# Patient Record
Sex: MALE | Race: WHITE | Employment: FULL TIME | URBAN - METROPOLITAN AREA
[De-identification: names, ages, dates, MRNs, and addresses within clinical notes are randomized per-mention and may not be internally consistent; named-entity substitution may affect disease eponyms.]

---

## 2019-06-19 ENCOUNTER — OFFICE VISIT (OUTPATIENT)
Dept: FAMILY MEDICINE | Facility: CLINIC | Age: 34
End: 2019-06-19
Payer: COMMERCIAL

## 2019-06-19 VITALS
HEART RATE: 69 BPM | BODY MASS INDEX: 34.71 KG/M2 | DIASTOLIC BLOOD PRESSURE: 84 MMHG | HEIGHT: 68 IN | TEMPERATURE: 98.5 F | OXYGEN SATURATION: 99 % | WEIGHT: 229 LBS | SYSTOLIC BLOOD PRESSURE: 130 MMHG

## 2019-06-19 DIAGNOSIS — Z00.00 ROUTINE GENERAL MEDICAL EXAMINATION AT A HEALTH CARE FACILITY: Primary | ICD-10-CM

## 2019-06-19 DIAGNOSIS — Z13.6 CARDIOVASCULAR SCREENING; LDL GOAL LESS THAN 160: ICD-10-CM

## 2019-06-19 DIAGNOSIS — B35.6 TINEA CRURIS: ICD-10-CM

## 2019-06-19 DIAGNOSIS — B00.1 RECURRENT COLD SORES: ICD-10-CM

## 2019-06-19 DIAGNOSIS — Z23 NEED FOR TDAP VACCINATION: ICD-10-CM

## 2019-06-19 LAB
ALBUMIN UR-MCNC: ABNORMAL MG/DL
APPEARANCE UR: CLEAR
BACTERIA #/AREA URNS HPF: ABNORMAL /HPF
BILIRUB UR QL STRIP: NEGATIVE
COLOR UR AUTO: YELLOW
ERYTHROCYTE [DISTWIDTH] IN BLOOD BY AUTOMATED COUNT: 13.6 % (ref 10–15)
GLUCOSE UR STRIP-MCNC: NEGATIVE MG/DL
HCT VFR BLD AUTO: 41.9 % (ref 40–53)
HGB BLD-MCNC: 14.8 G/DL (ref 13.3–17.7)
HGB UR QL STRIP: NEGATIVE
KETONES UR STRIP-MCNC: NEGATIVE MG/DL
LEUKOCYTE ESTERASE UR QL STRIP: NEGATIVE
MCH RBC QN AUTO: 28.6 PG (ref 26.5–33)
MCHC RBC AUTO-ENTMCNC: 35.3 G/DL (ref 31.5–36.5)
MCV RBC AUTO: 81 FL (ref 78–100)
NITRATE UR QL: NEGATIVE
NON-SQ EPI CELLS #/AREA URNS LPF: ABNORMAL /LPF
PH UR STRIP: 6 PH (ref 5–7)
PLATELET # BLD AUTO: 264 10E9/L (ref 150–450)
RBC # BLD AUTO: 5.18 10E12/L (ref 4.4–5.9)
RBC #/AREA URNS AUTO: ABNORMAL /HPF
SOURCE: ABNORMAL
SP GR UR STRIP: 1.02 (ref 1–1.03)
UROBILINOGEN UR STRIP-ACNC: 0.2 EU/DL (ref 0.2–1)
WBC # BLD AUTO: 4.4 10E9/L (ref 4–11)
WBC #/AREA URNS AUTO: ABNORMAL /HPF

## 2019-06-19 PROCEDURE — 36415 COLL VENOUS BLD VENIPUNCTURE: CPT | Performed by: FAMILY MEDICINE

## 2019-06-19 PROCEDURE — 86696 HERPES SIMPLEX TYPE 2 TEST: CPT | Performed by: FAMILY MEDICINE

## 2019-06-19 PROCEDURE — 84443 ASSAY THYROID STIM HORMONE: CPT | Performed by: FAMILY MEDICINE

## 2019-06-19 PROCEDURE — 99385 PREV VISIT NEW AGE 18-39: CPT | Mod: 25 | Performed by: FAMILY MEDICINE

## 2019-06-19 PROCEDURE — 86695 HERPES SIMPLEX TYPE 1 TEST: CPT | Performed by: FAMILY MEDICINE

## 2019-06-19 PROCEDURE — 86694 HERPES SIMPLEX NES ANTBDY: CPT | Performed by: FAMILY MEDICINE

## 2019-06-19 PROCEDURE — 90471 IMMUNIZATION ADMIN: CPT | Performed by: FAMILY MEDICINE

## 2019-06-19 PROCEDURE — 90715 TDAP VACCINE 7 YRS/> IM: CPT | Performed by: FAMILY MEDICINE

## 2019-06-19 PROCEDURE — 85027 COMPLETE CBC AUTOMATED: CPT | Performed by: FAMILY MEDICINE

## 2019-06-19 PROCEDURE — 80061 LIPID PANEL: CPT | Performed by: FAMILY MEDICINE

## 2019-06-19 PROCEDURE — 81001 URINALYSIS AUTO W/SCOPE: CPT | Performed by: FAMILY MEDICINE

## 2019-06-19 PROCEDURE — 80053 COMPREHEN METABOLIC PANEL: CPT | Performed by: FAMILY MEDICINE

## 2019-06-19 RX ORDER — LANOLIN ALCOHOL/MO/W.PET/CERES
CREAM (GRAM) TOPICAL DAILY
COMMUNITY

## 2019-06-19 RX ORDER — CHLORAL HYDRATE 500 MG
2 CAPSULE ORAL DAILY
COMMUNITY

## 2019-06-19 RX ORDER — VALACYCLOVIR HYDROCHLORIDE 1 G/1
2000 TABLET, FILM COATED ORAL 2 TIMES DAILY
Qty: 8 TABLET | Refills: 3 | Status: SHIPPED | OUTPATIENT
Start: 2019-06-19 | End: 2019-10-29

## 2019-06-19 RX ORDER — FAMOTIDINE 20 MG
TABLET ORAL
COMMUNITY

## 2019-06-19 ASSESSMENT — MIFFLIN-ST. JEOR: SCORE: 1958.24

## 2019-06-19 NOTE — PROGRESS NOTES
SUBJECTIVE:   CC: Сергей Cowan is an 34 year old male who presents for preventive health visit.     Healthy Habits:    Do you get at least three servings of calcium containing foods daily (dairy, green leafy vegetables, etc.)? no, taking calcium and/or vitamin D supplement: yes - multi    Amount of exercise or daily activities, outside of work: hockey weekly, golfing    Problems taking medications regularly No    Medication side effects: No    Have you had an eye exam in the past two years? yes    Do you see a dentist twice per year? yes    Do you have sleep apnea, excessive snoring or daytime drowsiness?no    Hx of Elevated Blood Pressure: Patient has had elevated blood pressure readings in the past. Patient presented today as normotensive with a blood pressure of 130/84. Patient is not currently prescribed any medications for blood pressure regulation.     Recurrent Cold Sores: Patient reports he has recurrent cold sores specifically in the winter. Patient states he has 1-2 cold sores per year.     BP Readings from Last 1 Encounters:   06/19/19 130/84     Today's PHQ-2 Score:   PHQ-2 ( 1999 Pfizer) 6/19/2019   Q1: Little interest or pleasure in doing things 1   Q2: Feeling down, depressed or hopeless 1   PHQ-2 Score 2       Abuse: Current or Past(Physical, Sexual or Emotional)- No  Do you feel safe in your environment? Yes    Social History     Tobacco Use     Smoking status: Never Smoker     Smokeless tobacco: Never Used   Substance Use Topics     Alcohol use: Yes     Alcohol/week: 0.6 - 1.2 oz     Types: 1 - 2 Standard drinks or equivalent per week     Comment: 1-2 per week     If you drink alcohol do you typically have >3 drinks per day or >7 drinks per week? No                      Last PSA: No results found for: PSA    Reviewed orders with patient. Reviewed health maintenance and updated orders accordingly - Yes    Reviewed and updated as needed this visit by clinical staff  Tobacco  Allergies  Meds  Med  Hx  Surg Hx  Fam Hx  Soc Hx      Reviewed and updated as needed this visit by Provider  Allergies        Health Maintenance     Colonoscopy:  At age 51   FIT:  At age 41              PSA:  At age 41   DEXA:  N/A    Health Maintenance Due   Topic Date Due     PREVENTIVE CARE VISIT  1986     DTAP/TDAP/TD IMMUNIZATION (1 - Tdap) 01/02/2011     PHQ-2  01/01/2019       Current Problem List    Patient Active Problem List   Diagnosis     CARDIOVASCULAR SCREENING; LDL GOAL LESS THAN 160     Recurrent cold sores       Past Medical History    Past Medical History:   Diagnosis Date     CARDIOVASCULAR SCREENING; LDL GOAL LESS THAN 160      Recurrent cold sores        Past Surgical History    Past Surgical History:   Procedure Laterality Date     DENTAL SURGERY  2007    wisdom teeth       Current Medications    Current Outpatient Medications   Medication Sig Dispense Refill     cyanocobalamin (VITAMIN B-12) 1000 MCG tablet Take by mouth daily       fish oil-omega-3 fatty acids 1000 MG capsule Take 2 g by mouth daily       Multiple Vitamins-Minerals (MULTIVITAL PO)        valACYclovir (VALTREX) 1000 mg tablet Take 2 tablets (2,000 mg) by mouth 2 times daily for 1 day 8 tablet 3     Vitamin D, Cholecalciferol, 1000 units CAPS          Allergies    No Known Allergies    Immunizations    Immunization History   Administered Date(s) Administered     MMR 03/25/1998     TDAP Vaccine (Adacel) 06/19/2019     Td (Adult), Adsorbed 03/25/1998, 09/15/2005       Family History    Family History   Problem Relation Age of Onset     Hypertension Father      Diabetes Paternal Grandfather        Social History    Social History     Socioeconomic History     Marital status: Single     Spouse name: Not on file     Number of children: 0     Years of education: 12     Highest education level: Not on file   Occupational History     Not on file   Social Needs     Financial resource strain: Not on file     Food insecurity:     Worry: Not on file  "    Inability: Not on file     Transportation needs:     Medical: Not on file     Non-medical: Not on file   Tobacco Use     Smoking status: Never Smoker     Smokeless tobacco: Never Used   Substance and Sexual Activity     Alcohol use: Yes     Alcohol/week: 0.6 - 1.2 oz     Types: 1 - 2 Standard drinks or equivalent per week     Comment: 1-2 per week     Drug use: Never     Sexual activity: Yes   Lifestyle     Physical activity:     Days per week: Not on file     Minutes per session: Not on file     Stress: Not on file   Relationships     Social connections:     Talks on phone: Not on file     Gets together: Not on file     Attends [employer] service: Not on file     Active member of club or organization: Not on file     Attends meetings of clubs or organizations: Not on file     Relationship status: Not on file     Intimate partner violence:     Fear of current or ex partner: Not on file     Emotionally abused: Not on file     Physically abused: Not on file     Forced sexual activity: Not on file   Other Topics Concern     Not on file   Social History Narrative     Not on file       ROS:  Constitutional, HEENT, cardiovascular, pulmonary, GI, , musculoskeletal, neuro, skin, endocrine and psych systems are negative, except as otherwise noted.    OBJECTIVE:   /84   Pulse 69   Temp 98.5  F (36.9  C) (Oral)   Ht 1.727 m (5' 8\")   Wt 103.9 kg (229 lb)   SpO2 99%   BMI 34.82 kg/m    EXAM:  GENERAL: healthy, alert and no distress  EYES: Eyes grossly normal to inspection  HENT:ear canals and TM's normal upon viewing with otoscope, nose and mouth without ulcers or lesions upon viewing with otoscope  NECK: no adenopathy, no asymmetry, masses, or scars and thyroid normal to palpation  RESP: lungs clear to auscultation - no rales, rhonchi or wheezes  CV: regular rate and rhythm, normal S1 S2, no S3 or S4, no murmur, click or rub, no peripheral edema and peripheral pulses strong  ABDOMEN: soft, nontender, no " hepatosplenomegaly, no masses and bowel sounds normal   (male): normal male genitalia without lesions or urethral discharge, no hernia, tinea cruris moderate bilaterally  MS: no gross musculoskeletal defects noted, no edema  SKIN: no suspicious lesions or rashes  NEURO: Normal strength and tone, mentation intact and speech normal  PSYCH: mentation appears normal, affect normal/bright  BACK: no CVA tenderness, no paralumbar tenderness    Diagnostic Test Results:  Results for orders placed or performed in visit on 06/19/19 (from the past 24 hour(s))   CBC with platelets   Result Value Ref Range    WBC 4.4 4.0 - 11.0 10e9/L    RBC Count 5.18 4.4 - 5.9 10e12/L    Hemoglobin 14.8 13.3 - 17.7 g/dL    Hematocrit 41.9 40.0 - 53.0 %    MCV 81 78 - 100 fl    MCH 28.6 26.5 - 33.0 pg    MCHC 35.3 31.5 - 36.5 g/dL    RDW 13.6 10.0 - 15.0 %    Platelet Count 264 150 - 450 10e9/L   UA reflex to Microscopic and Culture   Result Value Ref Range    Color Urine Yellow     Appearance Urine Clear     Glucose Urine Negative NEG^Negative mg/dL    Bilirubin Urine Negative NEG^Negative    Ketones Urine Negative NEG^Negative mg/dL    Specific Gravity Urine 1.025 1.003 - 1.035    Blood Urine Negative NEG^Negative    pH Urine 6.0 5.0 - 7.0 pH    Protein Albumin Urine Trace (A) NEG^Negative mg/dL    Urobilinogen Urine 0.2 0.2 - 1.0 EU/dL    Nitrite Urine Negative NEG^Negative    Leukocyte Esterase Urine Negative NEG^Negative    Source Midstream Urine    Urine Microscopic   Result Value Ref Range    WBC Urine 0 - 5 OTO5^0 - 5 /HPF    RBC Urine O - 2 OTO2^O - 2 /HPF    Squamous Epithelial /LPF Urine Few FEW^Few /LPF    Bacteria Urine Few (A) NEG^Negative /HPF        Labs Pending    ASSESSMENT/PLAN:       ICD-10-CM    1. Routine general medical examination at a health care facility Z00.00 Comprehensive metabolic panel     CBC with platelets     Lipid panel reflex to direct LDL Fasting     TSH with free T4 reflex     UA reflex to Microscopic and  "Culture     Urine Microscopic   2. CARDIOVASCULAR SCREENING; LDL GOAL LESS THAN 160 Z13.6    3. Recurrent cold sores B00.1 Herpes Simplex Virus 1 and 2 IgG     Herpes: HSV IgM antibody     valACYclovir (VALTREX) 1000 mg tablet   4. Tinea cruris B35.6    5. Need for Tdap vaccination Z23 TDAP, IM (10 - 64 YRS) - Adacel     ADMIN 1st VACCINE     Discussed treatment/modality options, including risk and benefits, he desires advised 1 multivitamin per day, advised dentist every 6 months, advised diet and exercise and advised opthalmologist every 1-2 years. All diagnosis above reviewed and noted above, otherwise stable.  See Floored orders for further details.      1) Recommend low salt diet and 150 minutes of exercise per week.    2) Patient prescribed Valtrex as needed today for recurrent cold sore treatment.     3) Received Tdap tetanus booster today. Recommend Hepatitis A&B vaccine.     4) Recommend OTC Lamisil cream for tinea cruris treatment.     5) Follow up in 1-3 years for complete physical exam.     Health Maintenance Due   Topic Date Due     PREVENTIVE CARE VISIT  1986     DTAP/TDAP/TD IMMUNIZATION (1 - Tdap) 01/02/2011     PHQ-2  01/01/2019       COUNSELING:  Reviewed preventive health counseling, as reflected in patient instructions    Estimated body mass index is 34.82 kg/m  as calculated from the following:    Height as of this encounter: 1.727 m (5' 8\").    Weight as of this encounter: 103.9 kg (229 lb).    Weight management plan: Discussed healthy diet and exercise guidelines     reports that he has never smoked. He has never used smokeless tobacco.    Counseling Resources:  ATP IV Guidelines  Pooled Cohorts Equation Calculator  FRAX Risk Assessment  ICSI Preventive Guidelines  Dietary Guidelines for Americans, 2010  USDA's MyPlate  ASA Prophylaxis  Lung CA Screening    This document serves as a record of the services and decisions personally performed and made by Abhijit Sandoval MD. It was created on " his behalf by Arnie Hoang, a trained medical scribe. The creation of this document is based on the provider's statements to the medical scribe.  Arnie Hoang June 19, 2019 11:00 AM     The information in this document, created by the medical scribe for me, accurately reflects the services I personally performed and the decisions made by me. I have reviewed and approved this document for accuracy prior to leaving the patient care area.  June 19, 2019          Abhijit Sandoval MD, FAAFP    [address]  [address]    [phone] Fax

## 2019-06-19 NOTE — LETTER
[facility]  [address] [place] [address], [place] [address]                  [phone]   June 28, 2019    Сергей Cowan  [address] [place] [address]      Dear Сергей,    Here is a summary of your recent test results:    Labs are overall quite good, with evidence of immunity/old cold sore infections.     We advise:     Continue current cares.   Balanced low cholesterol diet.   Regular exercise.     For additional lab test information, [email or web address] is an excellent reference.     Your test results are enclosed.      Please contact me if you have any questions.              Thank you very much for trusting [facility]..     Healthy regards,        Abhijit Sandoval M.D.        Results for orders placed or performed in visit on 06/19/19   Comprehensive metabolic panel   Result Value Ref Range    Sodium 141 133 - 144 mmol/L    Potassium 4.3 3.4 - 5.3 mmol/L    Chloride 108 94 - 109 mmol/L    Carbon Dioxide 21 20 - 32 mmol/L    Anion Gap 12 3 - 14 mmol/L    Glucose 92 70 - 99 mg/dL    Urea Nitrogen 13 7 - 30 mg/dL    Creatinine 1.06 0.66 - 1.25 mg/dL    GFR Estimate >90 >60 mL/min/[1.73_m2]    GFR Estimate If Black >90 >60 mL/min/[1.73_m2]    Calcium 9.1 8.5 - 10.1 mg/dL    Bilirubin Total 0.7 0.2 - 1.3 mg/dL    Albumin 4.1 3.4 - 5.0 g/dL    Protein Total 8.1 6.8 - 8.8 g/dL    Alkaline Phosphatase 67 40 - 150 U/L    ALT 53 0 - 70 U/L    AST 33 0 - 45 U/L   CBC with platelets   Result Value Ref Range    WBC 4.4 4.0 - 11.0 10e9/L    RBC Count 5.18 4.4 - 5.9 10e12/L    Hemoglobin 14.8 13.3 - 17.7 g/dL    Hematocrit 41.9 40.0 - 53.0 %    MCV 81 78 - 100 fl    MCH 28.6 26.5 - 33.0 pg    MCHC 35.3 31.5 - 36.5 g/dL    RDW 13.6 10.0 - 15.0 %    Platelet Count 264 150 - 450 10e9/L   Lipid panel reflex to direct LDL Fasting   Result Value Ref Range    Cholesterol 176 <200 mg/dL    Triglycerides 78 <150 mg/dL    HDL Cholesterol 45 >39 mg/dL    LDL Cholesterol Calculated 115  (H) <100 mg/dL    Non HDL Cholesterol 131 (H) <130 mg/dL   TSH with free T4 reflex   Result Value Ref Range    TSH 1.44 0.40 - 4.00 mU/L   UA reflex to Microscopic and Culture   Result Value Ref Range    Color Urine Yellow     Appearance Urine Clear     Glucose Urine Negative NEG^Negative mg/dL    Bilirubin Urine Negative NEG^Negative    Ketones Urine Negative NEG^Negative mg/dL    Specific Gravity Urine 1.025 1.003 - 1.035    Blood Urine Negative NEG^Negative    pH Urine 6.0 5.0 - 7.0 pH    Protein Albumin Urine Trace (A) NEG^Negative mg/dL    Urobilinogen Urine 0.2 0.2 - 1.0 EU/dL    Nitrite Urine Negative NEG^Negative    Leukocyte Esterase Urine Negative NEG^Negative    Source Midstream Urine    Herpes Simplex Virus 1 and 2 IgG   Result Value Ref Range    Herpes Simplex Virus Type 1 IgG >8.0 (H) 0.0 - 0.8 AI    Herpes Simplex Virus Type 2 IgG <0.2 0.0 - 0.8 AI   Herpes: HSV IgM antibody   Result Value Ref Range    Herpes Simplex Virus IgM Antibody 0.35 0.00 - 0.89 Index Value   Urine Microscopic   Result Value Ref Range    WBC Urine 0 - 5 OTO5^0 - 5 /HPF    RBC Urine O - 2 OTO2^O - 2 /HPF    Squamous Epithelial /LPF Urine Few FEW^Few /LPF    Bacteria Urine Few (A) NEG^Negative /HPF

## 2019-06-19 NOTE — PATIENT INSTRUCTIONS
[facility]                        To reach your care team during and after hours:   [phone]  To reach our pharmacy:        [phone]    Clinic Hours                        Our clinic hours are:    Monday   7:30 am to 7:00 pm                  Tuesday through Friday 7:30 am to 5:00 pm                             Saturday   8:00 am to 12:00 pm      Sunday   Closed      Pharmacy Hours                        Our pharmacy hours are:    Monday   8:30 am to 7:00 pm       Tuesday to Friday  8:30 am to 6:00 pm                       Saturday    9:00 am to 1:00 pm              Sunday    Closed              There is also information available at our web site:  [email or web address]    If your provider ordered any lab tests and you do not receive the results within 10 business days, please call the clinic.    If you need a medication refill please contact your pharmacy.  Please allow 2-3 business days for your refill to be completed.    Our clinic offers telephone visits and e visits.  Please ask one of your team members to explain more.      Use [employer] (secure email communication and access to your chart) to send your primary care provider a message or make an appointment. Ask someone on your Team how to sign up for [employer]  Immunizations                      Immunization History   Administered Date(s) Administered     MMR 03/25/1998     Td (Adult), Adsorbed 03/25/1998, 09/15/2005        Health Maintenance                         Health Maintenance Due   Topic Date Due     Preventive Care Visit  1986     One-time HIV Screening  01/02/2001     Diptheria Tetanus Pertussis (DTAP/TDAP/TD) Vaccine (1 - Tdap) 01/02/2011     PHQ-2  01/01/2019       Preventive Health Recommendations  Male Ages 26 - 39    Yearly exam:             See your health care provider every year in order to  o   Review health changes.   o   Discuss preventive care.    o   Review your medicines if your doctor has prescribed  any.    You should be tested each year for STDs (sexually transmitted diseases), if you re at risk.     After age 35, talk to your provider about cholesterol testing. If you are at risk for heart disease, have your cholesterol tested at least every 5 years.     If you are at risk for diabetes, you should have a diabetes test (fasting glucose).  Shots: Get a flu shot each year. Get a tetanus shot every 10 years.     Nutrition:    Eat at least 5 servings of fruits and vegetables daily.     Eat whole-grain bread, whole-wheat pasta and brown rice instead of white grains and rice.     Get adequate Calcium and Vitamin D.     Lifestyle    Exercise for at least 150 minutes a week (30 minutes a day, 5 days a week). This will help you control your weight and prevent disease.     Limit alcohol to one drink per day.     No smoking.     Wear sunscreen to prevent skin cancer.     See your dentist every six months for an exam and cleaning.

## 2019-06-20 LAB
ALBUMIN SERPL-MCNC: 4.1 G/DL (ref 3.4–5)
ALP SERPL-CCNC: 67 U/L (ref 40–150)
ALT SERPL W P-5'-P-CCNC: 53 U/L (ref 0–70)
ANION GAP SERPL CALCULATED.3IONS-SCNC: 12 MMOL/L (ref 3–14)
AST SERPL W P-5'-P-CCNC: 33 U/L (ref 0–45)
BILIRUB SERPL-MCNC: 0.7 MG/DL (ref 0.2–1.3)
BUN SERPL-MCNC: 13 MG/DL (ref 7–30)
CALCIUM SERPL-MCNC: 9.1 MG/DL (ref 8.5–10.1)
CHLORIDE SERPL-SCNC: 108 MMOL/L (ref 94–109)
CHOLEST SERPL-MCNC: 176 MG/DL
CO2 SERPL-SCNC: 21 MMOL/L (ref 20–32)
CREAT SERPL-MCNC: 1.06 MG/DL (ref 0.66–1.25)
GFR SERPL CREATININE-BSD FRML MDRD: >90 ML/MIN/{1.73_M2}
GLUCOSE SERPL-MCNC: 92 MG/DL (ref 70–99)
HDLC SERPL-MCNC: 45 MG/DL
HSV1 IGG SERPL QL IA: >8 AI (ref 0–0.8)
HSV2 IGG SERPL QL IA: <0.2 AI (ref 0–0.8)
LDLC SERPL CALC-MCNC: 115 MG/DL
NONHDLC SERPL-MCNC: 131 MG/DL
POTASSIUM SERPL-SCNC: 4.3 MMOL/L (ref 3.4–5.3)
PROT SERPL-MCNC: 8.1 G/DL (ref 6.8–8.8)
SODIUM SERPL-SCNC: 141 MMOL/L (ref 133–144)
TRIGL SERPL-MCNC: 78 MG/DL
TSH SERPL DL<=0.005 MIU/L-ACNC: 1.44 MU/L (ref 0.4–4)

## 2019-06-27 LAB — HSV 1+2 IGM SER-IMP: 0.35 INDEX VALUE (ref 0–0.89)

## 2019-07-08 ENCOUNTER — MYC MEDICAL ADVICE (OUTPATIENT)
Dept: FAMILY MEDICINE | Facility: CLINIC | Age: 34
End: 2019-07-08

## 2019-07-08 NOTE — TELEPHONE ENCOUNTER
Huddled with MD JING - see my chart message below     Christina Hare RN, BSN  [place] [facility]

## 2019-09-26 ENCOUNTER — NURSE TRIAGE (OUTPATIENT)
Dept: FAMILY MEDICINE | Facility: CLINIC | Age: 34
End: 2019-09-26

## 2019-09-26 NOTE — TELEPHONE ENCOUNTER
Called #   Telephone Information:   Mobile [phone]       Pt noted the blood is bright red and is not a constant issue. Pt notes it is a small amount at this time. Pt advised to keep future appt. Patient stated an understanding and agreed with plan.    Additional Information    Negative: Passed out (i.e., fainted, collapsed and was not responding)    Negative: Shock suspected (e.g., cold/pale/clammy skin, too weak to stand, low BP, rapid pulse)    Negative: Vomiting red blood or black (coffee ground) material    Negative: Sounds like a life-threatening emergency to the triager    Negative: Diarrhea is the main symptom    Negative: Rectal symptoms    Negative: SEVERE rectal bleeding (large blood clots; on and off, or constant bleeding)    Negative: SEVERE dizziness (e.g., unable to stand, requires support to walk, feels like passing out now)    Negative: MODERATE rectal bleeding (small blood clots, passing blood without stool, or toilet water turns red) more than once a day    Negative: Bloody, black, or tarry bowel movements    Negative: High-risk adult (e.g., prior surgery on aorta, abdominal aortic aneurysm)    Negative: Rectal foreign body (inserted or swallowed)    Negative: SEVERE abdominal pain (e.g., excruciating)    Negative: Constant abdominal pain lasting > 2 hours    Negative: Pale skin (pallor) of new onset or worsening    Negative: Patient sounds very sick or weak to the triager    Negative: MODERATE rectal bleeding (small blood clots, passing blood without stool, or toilet water turns red)    Negative: Taking Coumadin (warfarin) or other strong blood thinner, or known bleeding disorder (e.g., thrombocytopenia)    Negative: Colonoscopy in past 72 hours    Negative: Known cirrhosis of the liver (or history of liver failure or ascites)    Negative: Patient wants to be seen    Negative: MILD rectal bleeding (more than just a few drops or streaks)    Negative: Cancer of rectum or intestines (colon)     "Negative: Radiation therapy to lower abdomen or pelvis    Normal formed BM with a few streaks or drops of blood on surface of BM    Answer Assessment - Initial Assessment Questions  1. APPEARANCE of BLOOD: \"What color is it?\" \"Is it passed separately, on the surface of the stool, or mixed in with the stool?\"       Bright red, on the stool  2. AMOUNT: \"How much blood was passed?\"       1/2 teaspoon    3. FREQUENCY: \"How many times has blood been passed with the stools?\"       Every once and a while  4. ONSET: \"When was the blood first seen in the stools?\" (Days or weeks)       About a year or two not consistant  5. DIARRHEA: \"Is there also some diarrhea?\" If so, ask: \"How many diarrhea stools were passed in past 24 hours?\"       No    6. CONSTIPATION: \"Do you have constipation?\" If so, \"How bad is it?\"      No    7. RECURRENT SYMPTOMS: \"Have you had blood in your stools before?\" If so, ask: \"When was the last time?\" and \"What happened that time?\"       Yes some times when wiping    8. BLOOD THINNERS: \"Do you take any blood thinners?\" (e.g., Coumadin/warfarin, Pradaxa/dabigatran, aspirin)      No    9. OTHER SYMPTOMS: \"Do you have any other symptoms?\"  (e.g., abdominal pain, vomiting, dizziness, fever)      No    10. PREGNANCY: \"Is there any chance you are pregnant?\" \"When was your last menstrual period?\"        No    Protocols used: RECTAL BLEEDING-A-[place]    Jose J Jessica RN   [place] [facility]      "

## 2019-10-11 ENCOUNTER — OFFICE VISIT (OUTPATIENT)
Dept: FAMILY MEDICINE | Facility: CLINIC | Age: 34
End: 2019-10-11
Payer: COMMERCIAL

## 2019-10-11 VITALS
WEIGHT: 236 LBS | HEART RATE: 82 BPM | HEIGHT: 68 IN | SYSTOLIC BLOOD PRESSURE: 124 MMHG | TEMPERATURE: 96.9 F | DIASTOLIC BLOOD PRESSURE: 72 MMHG | BODY MASS INDEX: 35.77 KG/M2 | OXYGEN SATURATION: 97 %

## 2019-10-11 DIAGNOSIS — K92.1 BLOOD IN STOOL: Primary | ICD-10-CM

## 2019-10-11 DIAGNOSIS — K62.89 ANAL IRRITATION: ICD-10-CM

## 2019-10-11 PROCEDURE — 99213 OFFICE O/P EST LOW 20 MIN: CPT | Mod: 25 | Performed by: FAMILY MEDICINE

## 2019-10-11 PROCEDURE — 46600 DIAGNOSTIC ANOSCOPY SPX: CPT | Performed by: FAMILY MEDICINE

## 2019-10-11 ASSESSMENT — MIFFLIN-ST. JEOR: SCORE: 1989.99

## 2019-10-11 NOTE — PATIENT INSTRUCTIONS
Patient Education     What Is GERD?     With GERD, the weak LES allows food and fluids to travel back, or reflux, into the esophagus.      If you often have a painful burning feeling in your chest after you eat, you may have gastroesophageal reflux disease (GERD). Heartburn that keeps coming back is a classic symptom of GERD. But you may have other symptoms as well. A GERD diagnosis is made only after a complete evaluation by your healthcare provider.  Note: Chest pain may also be caused by heart problems. Be sure to have all chest pain evaluated by a healthcare provider.   When you have a reflux problem  After you eat, food travels from your mouth down the esophagus to your stomach. Along the way, food passes through a one-way valve called the lower esophageal sphincter (LES). The LES sits at the opening to your stomach. Normally the LES opens when you swallow. It lets food enter the stomach, then closes quickly. With GERD, the LES doesn t work normally. It lets food and stomach acid flow back (reflux) into the esophagus.  Some common symptoms    Frequent heartburn or burping    Sour-tasting fluid backing up into your mouth    Symptoms that get worse after you eat, bend over, or lie down    Trouble swallowing or pain when swallowing    A dry, long-term (chronic) cough    Upset stomach (nausea) or vomiting  Relieving your discomfort  You and your healthcare provider can work together to find the treatment options that best ease your symptoms. These may include lifestyle changes, medicine, and possibly surgery.  Many people find their GERD symptoms decrease when they eat small frequent meals instead of 3 large ones. Reducing the amount of fatty foods in your diet will also help.   The following foods tend to cause problems for people diagnosed with GERD:    Tomatoes and tomato products    Alcohol    Coffee    Peppermint    Greasy or spicy foods  Talk with your provider if you don t understand how to make the  dietary changes needed to control your GERD symptoms. Your provider can refer you to a nutritionist.  Date Last Reviewed: 7/1/2016 2000-2018 [employer]. [address] [place] [address], [place] [address]. All rights reserved. This information is not intended as a substitute for professional medical care. Always follow your healthcare professional's instructions.

## 2019-10-29 ENCOUNTER — MYC REFILL (OUTPATIENT)
Dept: FAMILY MEDICINE | Facility: CLINIC | Age: 34
End: 2019-10-29

## 2019-10-29 DIAGNOSIS — B00.1 RECURRENT COLD SORES: ICD-10-CM

## 2019-10-29 RX ORDER — VALACYCLOVIR HYDROCHLORIDE 1 G/1
2000 TABLET, FILM COATED ORAL 2 TIMES DAILY
Qty: 8 TABLET | Refills: 3 | Status: SHIPPED | OUTPATIENT
Start: 2019-10-29

## 2019-10-29 NOTE — TELEPHONE ENCOUNTER
"Requested Prescriptions   Pending Prescriptions Disp Refills     valACYclovir (VALTREX) 1000 mg tablet        Last Written Prescription Date:  6.19.19  Last Fill Quantity: 8 tablet,  # refills: 3   Last office visit: 10/11/2019 with prescribing provider:  Abhijit Sandoval MD             Future Office Visit:       8 tablet 3     Sig: Take 2 tablets (2,000 mg) by mouth 2 times daily       Antivirals for Herpes Protocol Passed - 10/29/2019  3:55 AM        Passed - Patient is age 12 or older        Passed - Recent (12 mo) or future (30 days) visit within the authorizing provider's specialty     Patient has had an office visit with the authorizing provider or a provider within the authorizing providers department within the previous 12 mos or has a future within next 30 days. See \"Patient Info\" tab in inbasket, or \"Choose Columns\" in Meds & Orders section of the refill encounter.              Passed - Medication is active on med list        Passed - Normal serum creatinine on file in past 12 months     Recent Labs   Lab Test 06/19/19  1126   CR 1.06             "

## 2019-11-06 ENCOUNTER — OFFICE VISIT (OUTPATIENT)
Dept: FAMILY MEDICINE | Facility: CLINIC | Age: 34
End: 2019-11-06
Payer: COMMERCIAL

## 2019-11-06 VITALS
HEIGHT: 68 IN | DIASTOLIC BLOOD PRESSURE: 76 MMHG | BODY MASS INDEX: 35.77 KG/M2 | WEIGHT: 236 LBS | OXYGEN SATURATION: 98 % | HEART RATE: 77 BPM | TEMPERATURE: 98.6 F | SYSTOLIC BLOOD PRESSURE: 128 MMHG

## 2019-11-06 DIAGNOSIS — F41.9 ANXIETY: Primary | ICD-10-CM

## 2019-11-06 PROCEDURE — 99213 OFFICE O/P EST LOW 20 MIN: CPT | Performed by: NURSE PRACTITIONER

## 2019-11-06 ASSESSMENT — ANXIETY QUESTIONNAIRES
6. BECOMING EASILY ANNOYED OR IRRITABLE: NEARLY EVERY DAY
3. WORRYING TOO MUCH ABOUT DIFFERENT THINGS: MORE THAN HALF THE DAYS
5. BEING SO RESTLESS THAT IT IS HARD TO SIT STILL: SEVERAL DAYS
IF YOU CHECKED OFF ANY PROBLEMS ON THIS QUESTIONNAIRE, HOW DIFFICULT HAVE THESE PROBLEMS MADE IT FOR YOU TO DO YOUR WORK, TAKE CARE OF THINGS AT HOME, OR GET ALONG WITH OTHER PEOPLE: SOMEWHAT DIFFICULT
1. FEELING NERVOUS, ANXIOUS, OR ON EDGE: NEARLY EVERY DAY
2. NOT BEING ABLE TO STOP OR CONTROL WORRYING: NEARLY EVERY DAY
GAD7 TOTAL SCORE: 15
7. FEELING AFRAID AS IF SOMETHING AWFUL MIGHT HAPPEN: MORE THAN HALF THE DAYS

## 2019-11-06 ASSESSMENT — PATIENT HEALTH QUESTIONNAIRE - PHQ9
SUM OF ALL RESPONSES TO PHQ QUESTIONS 1-9: 20
5. POOR APPETITE OR OVEREATING: SEVERAL DAYS

## 2019-11-06 ASSESSMENT — MIFFLIN-ST. JEOR: SCORE: 1989.99

## 2019-11-06 NOTE — PROGRESS NOTES
Subjective   Сергей Cowan is a 34 year old male who presents to clinic today for the following health issues:    HPI     [employer] forms to complete for Anxiety.   Has not been seen by a provider for anxiety in the past.     Abnormal Mood Symptoms      Duration: since 1998    Description:  Depression: YES intermittent  Anxiety: YES  Panic attacks: possibly    Accompanying signs and symptoms: see PHQ-9 and LUZ scores; reports some stomach aching sweating increased heart rate and rare flushing    History (similar episodes/previous evaluation): Reports he has had anxiety a very long time but has not had an office visit to discuss since middle school     Precipitating or alleviating factors: Patient describes struggling relationships, nervousness, social anxiety when meeting people or in crowds  Therapies tried and outcome: Effexor XR (Venafaxine) in middle school reports this was not helpful to him.  He is requesting FMLA paperwork so he can attend hypnotic therapy biweekly for 5 sessions to see if this is helpful and he has improvement.  He does not want to have medications at this time.    Past Medical History:   Diagnosis Date     CARDIOVASCULAR SCREENING; LDL GOAL LESS THAN 160      Recurrent cold sores      Past Surgical History:   Procedure Laterality Date     DENTAL SURGERY  2007    wisdom teeth     PHQ-9 SCORE 11/6/2019   PHQ-9 Total Score 20     On PHQ9 patient circled thoughts of better off dead or hurting self as several times over the last 2 weeks however when I inquired and discussed this with him he denies any suicidal or homicidal ideation over the last several weeks or today.     LUZ-7 SCORE 11/6/2019   Total Score 15     Reviewed and updated as needed this visit by provider:  Tobacco  Allergies  Meds  Problems  Med Hx  Surg Hx  Fam Hx       Review of Systems   Constitutional, HEENT, cardiovascular, pulmonary, GI, , musculoskeletal, neuro, skin, endocrine and psych systems are negative, except as  "otherwise noted in the HPI.          Objective   /76 (BP Location: Left arm, Patient Position: Chair, Cuff Size: Adult Large)   Pulse 77   Temp 98.6  F (37  C) (Oral)   Ht 1.727 m (5' 8\")   Wt 107 kg (236 lb)   SpO2 98%   BMI 35.88 kg/m   Body mass index is 35.88 kg/m .  Physical Exam   GENERAL: Obese, alert, well nourished, well hydrated, no distress  RESP: lungs clear to auscultation - no rales, no rhonchi, no wheezes  CV: regular rates and rhythm, normal S1 S2, no S3 or S4 and no murmur, no click or rub -  PSYCH: Alert and oriented times 3; speech- coherent , normal rate and volume; able to articulate logical thoughts, able to abstract reason, no tangential thoughts, no hallucinations or delusions, affect- normal      Assessment & Plan   Сергей was seen today for forms.    Diagnoses and all orders for this visit:    Anxiety    FMLA paperwork completed.  I fully supports outpatient therapies as an option to treat.  Encourage patient to follow-up within the next 6 months or sooner as needed if symptoms continue without relief from his therapy.  Also discussed options of medications including meds that are not in the antidepressant class i.e. propranolol.     BMI:   Estimated body mass index is 35.88 kg/m  as calculated from the following:    Height as of 10/11/19: 1.727 m (5' 8\").    Weight as of 10/11/19: 107 kg (236 lb).     See Patient Instructions    Return in about 3 months (around 2/6/2020) for Recheck 3-6 months or sooner if symptoms worsen.     Rosita Rizvi, [facility]-[address] [place] [address] [address]  [email or web address]   Office: [phone]      "

## 2019-11-06 NOTE — PATIENT INSTRUCTIONS
Patient Education     Treating Anxiety Disorders with Therapy    If you have an anxiety disorder, you don t have to suffer anymore. Treatment is available. Therapy (also called counseling) is often a helpful treatment for anxiety disorders. With therapy, a specially trained professional (therapist) helps you face and learn to manage your anxiety. Therapy can be short-term or long-term depending on your needs. In some cases, medicine may also be prescribed with therapy. It may take time before you notice how much therapy is helping, but stick with it. With therapy, you can feel better.  Cognitive behavioral therapy (CBT)  Cognitive behavioral therapy (CBT) teaches you to manage anxiety. It does this by helping you understand how you think and act when you re anxious. Research has shown CBT to be a very effective treatment for anxiety disorders. How CBT is run is almost like a class. It involves homework and activities to build skills that teach you to cope with anxiety step by step. It can be done in a group or one-on-one, and often takes place for a set number of sessions. CBT has two main parts:    Cognitive therapy helps you identify the negative, irrational thoughts that occur with your anxiety. You ll learn to replace these with more positive, realistic thoughts.    Behavioral therapy helps you change how you react to anxiety. You ll learn coping skills and methods for relaxing to help you better deal with anxiety.  Other forms of therapy  Other therapy methods may work better for you than CBT. Or, you may move from CBT to another form of therapy as your treatment needs change. This may mean meeting with a therapist by yourself or in a group. Therapy can also help you work through problems in your life, such as drug or alcohol dependence, that may be making your anxiety worse.  Getting better takes time  Therapy will help you feel better and teach you skills to help manage anxiety long term. But change doesn t  happen right away. It takes a commitment from you. And treatment only works if you learn to face the causes of your anxiety. So, you might feel worse before you feel better. This can sometimes make it hard to stick with it. But remember: Therapy is a very effective treatment. The results will be well worth it.  Helping yourself  If anxiety is wearing you down, here are some things you can do to cope:    Check with your doctor and rule out any physical problems that may be causing the anxiety symptoms.    If an anxiety disorder is diagnosed, seek mental healthcare. This is an illness and it can respond to treatment. Most types of anxiety disorders will respond to talk therapy and medicine.    Educate yourself about anxiety disorders. Keep track of helpful online resources and books you can use during stressful periods.    Try stress management techniques such as meditation.    Consider online or in-person support groups.    Don t fight your feelings. Anxiety feeds itself. The more you worry about it, the worse it gets. Instead, try to identify what might have triggered your anxiety. Then try to put this threat in perspective.    Keep in mind that you can t control everything about a situation. Change what you can and let the rest take its course.    Exercise -- it s a great way to relieve tension and help your body feel relaxed.    Examine your life for stress, and try to find ways to reduce it.    Avoid caffeine and nicotine, which can make anxiety symptoms worse.    Fight the temptation to turn to alcohol or unprescribed drugs for relief. They only make things worse in the long run.   Date Last Reviewed: 1/1/2017 2000-2018 [employer]. [address] [place] [address], [place] [address]. All rights reserved. This information is not intended as a substitute for professional medical care. Always follow your healthcare professional's instructions.       Patient Education     Understanding Anxiety  Disorders  Almost everyone gets nervous now and then. It s normal to have knots in your stomach before a test, or for your heart to race on a first date. But an anxiety disorder is much more than a case of nerves. In fact, its symptoms may be overwhelming. But treatment can relieve many of these symptoms. Talking to your healthcare provider is the first step.  What are anxiety disorders?  An anxiety disorder causes intense feelings of panic and fear. These feelings may arise for no apparent reason. And they tend to recur again and again. They may prevent you from coping with life and cause you great distress. As a result, you may avoid anything that triggers your fear. In extreme cases, you may never leave the house. Anxiety disorders may cause other symptoms, such as:    Obsessive thoughts you can t control    Constant nightmares or painful thoughts of the past    Nausea, sweating, and muscle tension    Trouble sleeping or concentrating  What causes anxiety disorders?  Anxiety disorders tend to run in families. For some people, childhood abuse or neglect may play a role. For others, stressful life events or trauma may trigger anxiety disorders. Anxiety can trigger low self-esteem and poor coping skills.  Common anxiety disorders    Panic disorder. This causes an intense fear of being in danger.    Phobias. These are extreme fears of certain objects, places, or events.    Obsessive-compulsive disorder. This causes you to have unwanted thoughts and urges. You also may perform certain actions over and over.    Posttraumatic stress disorder. This occurs in people who have survived a terrible ordeal. It can cause nightmares and flashbacks about the event.    Generalized anxiety disorder. This causes constant worry that can greatly disrupt your life.   Getting better  You may believe that nothing can help you. Or, you might fear what others may think. But most anxiety symptoms can be eased. Having an anxiety disorder is  nothing to be ashamed of. Most people do best with treatment that combines medicine and therapy. These aren t cures. But they can help you live a healthier life.  Date Last Reviewed: 2/1/2017 2000-2018 [employer]. [address] [place] [address], [place] [address]. All rights reserved. This information is not intended as a substitute for professional medical care. Always follow your healthcare professional's instructions.

## 2019-11-07 ASSESSMENT — ANXIETY QUESTIONNAIRES: GAD7 TOTAL SCORE: 15

## 2021-01-03 ENCOUNTER — HEALTH MAINTENANCE LETTER (OUTPATIENT)
Age: 36
End: 2021-01-03

## 2021-10-10 ENCOUNTER — HEALTH MAINTENANCE LETTER (OUTPATIENT)
Age: 36
End: 2021-10-10

## 2022-01-30 ENCOUNTER — HEALTH MAINTENANCE LETTER (OUTPATIENT)
Age: 37
End: 2022-01-30

## 2022-09-18 ENCOUNTER — HEALTH MAINTENANCE LETTER (OUTPATIENT)
Age: 37
End: 2022-09-18

## 2023-05-07 ENCOUNTER — HEALTH MAINTENANCE LETTER (OUTPATIENT)
Age: 38
End: 2023-05-07
